# Patient Record
Sex: FEMALE | Race: WHITE
[De-identification: names, ages, dates, MRNs, and addresses within clinical notes are randomized per-mention and may not be internally consistent; named-entity substitution may affect disease eponyms.]

---

## 2017-10-24 ENCOUNTER — HOSPITAL ENCOUNTER (EMERGENCY)
Dept: HOSPITAL 46 - ED | Age: 47
Discharge: HOME | End: 2017-10-24
Payer: MEDICARE

## 2017-10-24 VITALS — WEIGHT: 276.99 LBS | HEIGHT: 66 IN | BODY MASS INDEX: 44.52 KG/M2

## 2017-10-24 DIAGNOSIS — Z00.8: ICD-10-CM

## 2017-10-24 DIAGNOSIS — M54.5: Primary | ICD-10-CM

## 2017-11-16 ENCOUNTER — HOSPITAL ENCOUNTER (EMERGENCY)
Dept: HOSPITAL 46 - ED | Age: 47
Discharge: HOME | End: 2017-11-16
Payer: MEDICARE

## 2017-11-16 VITALS — WEIGHT: 277 LBS | BODY MASS INDEX: 44.52 KG/M2 | HEIGHT: 66 IN

## 2017-11-16 DIAGNOSIS — I10: ICD-10-CM

## 2017-11-16 DIAGNOSIS — J44.9: ICD-10-CM

## 2017-11-16 DIAGNOSIS — F17.200: ICD-10-CM

## 2017-11-16 DIAGNOSIS — Z88.8: ICD-10-CM

## 2017-11-16 DIAGNOSIS — Z79.82: ICD-10-CM

## 2017-11-16 DIAGNOSIS — Z90.712: ICD-10-CM

## 2017-11-16 DIAGNOSIS — Z79.899: ICD-10-CM

## 2017-11-16 DIAGNOSIS — F41.9: ICD-10-CM

## 2017-11-16 DIAGNOSIS — E11.40: ICD-10-CM

## 2017-11-16 DIAGNOSIS — Z79.84: ICD-10-CM

## 2017-11-16 DIAGNOSIS — Z98.51: ICD-10-CM

## 2017-11-16 DIAGNOSIS — E11.65: Primary | ICD-10-CM

## 2017-11-16 DIAGNOSIS — Z86.14: ICD-10-CM

## 2017-11-16 DIAGNOSIS — F31.9: ICD-10-CM

## 2017-11-16 NOTE — EKG
[facility]
                                    [address]
                                  [place]  [address]
_________________________________________________________________________________________
                                                                 Signed   
 
 
Normal sinus rhythm
Normal ECG
When compared with ECG of 09-JUN-2017 06:59,
No significant change was found
Confirmed by STEFF SOMMERS MD ([id number]) on 11/16/2017 8:42:48 PM
 
 
 
 
 
 
 
 
 
 
 
 
 
 
 
 
 
 
 
 
 
 
 
 
 
 
 
 
 
 
 
 
 
 
 
 
 
 
 
 
    Electronically Signed By: STEFF SOMMERS MD  11/16/17 2042
_________________________________________________________________________________________
PATIENT NAME:     ANA MARIA LOGAN THOMAS                  
MEDICAL RECORD #: [id number]                     Electrocardiogram             
          ACCT #: [id number]  
DATE OF BIRTH:   [birth year]                                       
PHYSICIAN:   STEFF SOMMERS MD           REPORT #: [id number]
REPORT IS CONFIDENTIAL AND NOT TO BE RELEASED WITHOUT AUTHORIZATION

## 2018-01-08 ENCOUNTER — HOSPITAL ENCOUNTER (EMERGENCY)
Dept: HOSPITAL 46 - ED | Age: 48
Discharge: HOME | End: 2018-01-08
Payer: MEDICARE

## 2018-01-08 VITALS — HEIGHT: 66 IN | WEIGHT: 293 LBS | BODY MASS INDEX: 47.09 KG/M2

## 2018-01-08 DIAGNOSIS — Z88.8: ICD-10-CM

## 2018-01-08 DIAGNOSIS — Z90.710: ICD-10-CM

## 2018-01-08 DIAGNOSIS — F17.200: ICD-10-CM

## 2018-01-08 DIAGNOSIS — R10.2: Primary | ICD-10-CM

## 2018-01-08 DIAGNOSIS — Z98.51: ICD-10-CM

## 2018-01-08 DIAGNOSIS — R11.0: ICD-10-CM

## 2018-02-08 ENCOUNTER — HOSPITAL ENCOUNTER (EMERGENCY)
Dept: HOSPITAL 46 - ED | Age: 48
Discharge: HOME | End: 2018-02-08
Payer: MEDICARE

## 2018-02-08 VITALS — WEIGHT: 293 LBS | BODY MASS INDEX: 47.09 KG/M2 | HEIGHT: 66 IN

## 2018-02-08 DIAGNOSIS — F43.10: ICD-10-CM

## 2018-02-08 DIAGNOSIS — F17.200: ICD-10-CM

## 2018-02-08 DIAGNOSIS — I10: ICD-10-CM

## 2018-02-08 DIAGNOSIS — Z88.8: ICD-10-CM

## 2018-02-08 DIAGNOSIS — F31.9: ICD-10-CM

## 2018-02-08 DIAGNOSIS — E11.40: ICD-10-CM

## 2018-02-08 DIAGNOSIS — H66.91: ICD-10-CM

## 2018-02-08 DIAGNOSIS — F41.9: ICD-10-CM

## 2018-02-08 DIAGNOSIS — J45.909: ICD-10-CM

## 2018-02-08 DIAGNOSIS — J11.1: Primary | ICD-10-CM

## 2018-03-31 ENCOUNTER — HOSPITAL ENCOUNTER (EMERGENCY)
Dept: HOSPITAL 46 - ED | Age: 48
Discharge: HOME | End: 2018-03-31
Payer: MEDICARE

## 2018-03-31 VITALS — BODY MASS INDEX: 47.09 KG/M2 | WEIGHT: 293 LBS | HEIGHT: 66 IN

## 2018-03-31 DIAGNOSIS — Z79.899: ICD-10-CM

## 2018-03-31 DIAGNOSIS — F17.200: ICD-10-CM

## 2018-03-31 DIAGNOSIS — F31.9: ICD-10-CM

## 2018-03-31 DIAGNOSIS — Z79.2: ICD-10-CM

## 2018-03-31 DIAGNOSIS — J44.9: ICD-10-CM

## 2018-03-31 DIAGNOSIS — I10: ICD-10-CM

## 2018-03-31 DIAGNOSIS — Z88.8: ICD-10-CM

## 2018-03-31 DIAGNOSIS — F41.9: ICD-10-CM

## 2018-03-31 DIAGNOSIS — R07.89: Primary | ICD-10-CM

## 2018-03-31 DIAGNOSIS — E11.40: ICD-10-CM

## 2018-03-31 DIAGNOSIS — F43.10: ICD-10-CM

## 2018-04-01 NOTE — EKG
[facility]
                                    [address]
                                  [place]  [address]
_________________________________________________________________________________________
                                                                 Signed   
 
 
Sinus tachycardia
Incomplete right bundle branch block
Borderline ECG
When compared with ECG of 16-NOV-2017 13:07,
Incomplete right bundle branch block is now present
Confirmed by STEFF SOMMERS MD ([id number]) on 4/1/2018 2:27:06 PM
 
 
 
 
 
 
 
 
 
 
 
 
 
 
 
 
 
 
 
 
 
 
 
 
 
 
 
 
 
 
 
 
 
 
 
 
 
 
 
    Electronically Signed By: STEFF SOMMERS MD  04/01/18 1427
_________________________________________________________________________________________
PATIENT NAME:     DOREENANA MARIA                  
MEDICAL RECORD #: [id number]                     Electrocardiogram             
          ACCT #: [id number]  
DATE OF BIRTH:   [birth year]                                       
PHYSICIAN:   STEFF SOMMERS MD           REPORT #: [id number]
REPORT IS CONFIDENTIAL AND NOT TO BE RELEASED WITHOUT AUTHORIZATION

## 2018-05-15 ENCOUNTER — HOSPITAL ENCOUNTER (EMERGENCY)
Dept: HOSPITAL 46 - ED | Age: 48
Discharge: HOME | End: 2018-05-15
Payer: MEDICARE

## 2018-05-15 VITALS — HEIGHT: 66 IN | BODY MASS INDEX: 45.48 KG/M2 | WEIGHT: 283.01 LBS

## 2018-05-15 DIAGNOSIS — E11.40: ICD-10-CM

## 2018-05-15 DIAGNOSIS — F17.200: ICD-10-CM

## 2018-05-15 DIAGNOSIS — F43.10: ICD-10-CM

## 2018-05-15 DIAGNOSIS — Z88.8: ICD-10-CM

## 2018-05-15 DIAGNOSIS — I10: ICD-10-CM

## 2018-05-15 DIAGNOSIS — Z79.84: ICD-10-CM

## 2018-05-15 DIAGNOSIS — J44.9: ICD-10-CM

## 2018-05-15 DIAGNOSIS — Z79.899: ICD-10-CM

## 2018-05-15 DIAGNOSIS — F31.9: ICD-10-CM

## 2018-05-15 DIAGNOSIS — L03.115: Primary | ICD-10-CM

## 2018-05-15 DIAGNOSIS — F41.9: ICD-10-CM

## 2018-05-19 ENCOUNTER — HOSPITAL ENCOUNTER (EMERGENCY)
Dept: HOSPITAL 46 - ED | Age: 48
LOS: 1 days | Discharge: HOME | End: 2018-05-20
Payer: MEDICARE

## 2018-05-19 VITALS — WEIGHT: 283.01 LBS | HEIGHT: 66 IN | BODY MASS INDEX: 45.48 KG/M2

## 2018-05-19 DIAGNOSIS — I10: ICD-10-CM

## 2018-05-19 DIAGNOSIS — Z79.899: ICD-10-CM

## 2018-05-19 DIAGNOSIS — E11.40: ICD-10-CM

## 2018-05-19 DIAGNOSIS — F17.200: ICD-10-CM

## 2018-05-19 DIAGNOSIS — L03.115: Primary | ICD-10-CM

## 2018-05-19 DIAGNOSIS — J44.9: ICD-10-CM

## 2018-05-19 DIAGNOSIS — Z88.8: ICD-10-CM

## 2018-05-29 ENCOUNTER — HOSPITAL ENCOUNTER (EMERGENCY)
Dept: HOSPITAL 46 - ED | Age: 48
Discharge: HOME | End: 2018-05-29
Payer: MEDICARE

## 2018-05-29 VITALS — HEIGHT: 66 IN | BODY MASS INDEX: 45.48 KG/M2 | WEIGHT: 283.01 LBS

## 2018-05-29 DIAGNOSIS — R60.0: ICD-10-CM

## 2018-05-29 DIAGNOSIS — E11.9: ICD-10-CM

## 2018-05-29 DIAGNOSIS — L03.115: Primary | ICD-10-CM

## 2018-05-29 DIAGNOSIS — Z88.8: ICD-10-CM

## 2018-05-29 DIAGNOSIS — Z79.899: ICD-10-CM

## 2018-05-29 DIAGNOSIS — I10: ICD-10-CM

## 2018-05-29 DIAGNOSIS — F17.200: ICD-10-CM

## 2018-05-29 DIAGNOSIS — J44.9: ICD-10-CM

## 2018-05-29 DIAGNOSIS — Z79.84: ICD-10-CM

## 2019-03-11 ENCOUNTER — HOSPITAL ENCOUNTER (EMERGENCY)
Dept: HOSPITAL 46 - ED | Age: 49
Discharge: HOME | End: 2019-03-11
Payer: MEDICARE

## 2019-03-11 VITALS — HEIGHT: 66 IN | BODY MASS INDEX: 45.48 KG/M2 | WEIGHT: 283.01 LBS

## 2019-03-11 DIAGNOSIS — Z79.899: ICD-10-CM

## 2019-03-11 DIAGNOSIS — F31.9: ICD-10-CM

## 2019-03-11 DIAGNOSIS — E11.40: ICD-10-CM

## 2019-03-11 DIAGNOSIS — J44.9: ICD-10-CM

## 2019-03-11 DIAGNOSIS — G47.30: ICD-10-CM

## 2019-03-11 DIAGNOSIS — B34.9: Primary | ICD-10-CM

## 2019-03-11 DIAGNOSIS — Z79.82: ICD-10-CM

## 2019-03-11 DIAGNOSIS — F43.10: ICD-10-CM

## 2019-03-11 DIAGNOSIS — F17.200: ICD-10-CM

## 2019-03-11 DIAGNOSIS — Z88.8: ICD-10-CM

## 2019-03-11 DIAGNOSIS — F41.9: ICD-10-CM

## 2019-03-11 DIAGNOSIS — Z90.710: ICD-10-CM

## 2019-03-11 PROCEDURE — C9113 INJ PANTOPRAZOLE SODIUM, VIA: HCPCS

## 2019-03-11 NOTE — XMS
PreManage Notification: ANA MARIA LOGAN MRN:[id number]
 
Security Information
 
Security Events
No recent Security Events currently on file
 
 
 
CRITERIA MET
------------
- Group Notification
- [facility] Guidelines
- [facility]
 
 
CARE PROVIDERS
-------------------------------------------------------------------------------------
DREW ODONNELL     Nurse Practitioner: Family     11/26/2018-Current
 
PHONE: Unknown
-------------------------------------------------------------------------------------
JENNIFER YATES     Case or Care Manager     Current
 
PHONE: [phone]
-------------------------------------------------------------------------------------
Drew Snell     Current
 
PHONE: [phone]
-------------------------------------------------------------------------------------
Igor Shah     Primary Care     Current
 
PHONE: Unknown
-------------------------------------------------------------------------------------
 
Guidelines Source: [facility]
Guidelines Date: 06/06/2018
 
Care Coordination:
    PCP DREW CHAND - [facility] PER PCP -\T\nbsp; PATIENT IS TO 
    CALL CLINIC OR USE CLINIC WALK-IN FOR CHRONIC LEG ISSUES.\T\nbsp; IF PATIENT
    CHECKS INTO ED DURING BUSINESS HOURS, PLEASE CALL PROVIDER OR HIS MOE MCKEON AT
    [phone]. OR CALL CLINIC GENE RODRIGUEZ [phone].
 
Care History
Medical/Surgical
11/26/2018    [facility]
 
      - Patient is currently established with [facility]. If patient is seen 
      in the ED during business hours. Please contact [facility] at [facility].
      Care Recommendation: This patient has had 5 or more Emergency Department
      visits in the last 12 months.\T\nbsp; Patient requires education on the scope
      and purpose of the ED as an acute care provider not a Primary Care Provider
      and should not be utilized for chronic conditions.\T\nbsp;These are guidelines
      and the provider should exercise clinical judgment when providing care
E.D. VISIT COUNT (12 MO.)
-------------------------------------------------------------------------------------
[address] [place]
 
 
6 [facility].
-------------------------------------------------------------------------------------
TOTAL 7
-------------------------------------------------------------------------------------
NOTE: Visits indicate total known visits.
 
ED/UCC VISIT TRACKING (12 MO.)
-------------------------------------------------------------------------------------
03/11/2019 16:54
[facility]
 
TYPE: Emergency
 
COMPLAINT:
- VOMITTING/DIARRHEA/VISION PROBLEM
-------------------------------------------------------------------------------------
11/25/2018 18:57
[facility]
 
TYPE: Emergency
 
COMPLAINT:
- COUGH
 
DIAGNOSES:
- Other long term (current) drug therapy
- Type 2 diabetes mellitus without complications
- Chronic obstructive pulmonary disease with (acute) exacerbation
- Cough
- Nicotine dependence, unspecified, uncomplicated
- Essential (primary) hypertension
-------------------------------------------------------------------------------------
06/21/2018 11:06
[facility] Keisha LAZO
 
TYPE: Emergency
 
DIAGNOSES:
- Leg pain and redness
- Leg Pain (Non-traumatic)
- Tobacco use
- Tobacco abuse counseling
- Edema, unspecified
-------------------------------------------------------------------------------------
05/29/2018 11:34
[facility] [place]
 
TYPE: Emergency
 
COMPLAINT:
- R LEG SWELLING/NO INJURY
 
DIAGNOSES:
- Other specified soft tissue disorders
- Nicotine dependence, unspecified, uncomplicated
- Localized edema
- Allergy status to other drugs, medicaments and biological substances status
- Type 2 diabetes mellitus without complications
- Essential (primary) hypertension
- Cellulitis of right lower limb
 
- LONG TERM (CURRENT) USE OF ORAL HYPOGLYCEMIC DRUGS
- Chronic obstructive pulmonary disease, unspecified
- Other long term (current) drug therapy
-------------------------------------------------------------------------------------
05/19/2018 20:58
[facility] [place]
 
TYPE: Emergency
 
COMPLAINT:
- R LEG INFECTION
 
DIAGNOSES:
- Allergy status to other drugs, medicaments and biological substances status
- Cellulitis of right lower limb
- Essential (primary) hypertension
- Other specified soft tissue disorders
- Type 2 diabetes mellitus with diabetic neuropathy, unspecified
- Chronic obstructive pulmonary disease, unspecified
- Nicotine dependence, unspecified, uncomplicated
- Other long term (current) drug therapy
-------------------------------------------------------------------------------------
05/15/2018 11:35
[facility] [place]
 
TYPE: Emergency
 
COMPLAINT:
- R FOOT PAIN/NO INJURY
 
DIAGNOSES:
- Nicotine dependence, unspecified, uncomplicated
- Type 2 diabetes mellitus with diabetic neuropathy, unspecified
- Chronic obstructive pulmonary disease, unspecified
- Bipolar disorder, unspecified
- Other long term (current) drug therapy
- Cellulitis of right lower limb
- LONG TERM (CURRENT) USE OF ORAL HYPOGLYCEMIC DRUGS
- Allergy status to other drugs, medicaments and biological substances status
- Post-traumatic stress disorder, unspecified
- Essential (primary) hypertension
- Anxiety disorder, unspecified
-------------------------------------------------------------------------------------
03/31/2018 17:14
[facility] [place]
 
TYPE: Emergency
 
COMPLAINT:
- CHEST PAIN
 
DIAGNOSES:
- Other chest pain
- Anxiety disorder, unspecified
- Nicotine dependence, unspecified, uncomplicated
- Essential (primary) hypertension
- Long term (current) use of antibiotics
- Bipolar disorder, unspecified
- Post-traumatic stress disorder, unspecified
- Other long term (current) drug therapy
 
- Type 2 diabetes mellitus with diabetic neuropathy, unspecified
- Allergy status to other drugs, medicaments and biological substances status
- Chronic obstructive pulmonary disease, unspecified
-------------------------------------------------------------------------------------
 
 
INPATIENT VISIT TRACKING (12 MO.)
No inpatient visits to display in this time frame
 
[email or web address]